# Patient Record
Sex: MALE | Race: WHITE | NOT HISPANIC OR LATINO | Employment: OTHER | URBAN - METROPOLITAN AREA
[De-identification: names, ages, dates, MRNs, and addresses within clinical notes are randomized per-mention and may not be internally consistent; named-entity substitution may affect disease eponyms.]

---

## 2024-02-13 ENCOUNTER — HOSPITAL ENCOUNTER (EMERGENCY)
Facility: CLINIC | Age: 64
Discharge: HOME | End: 2024-02-13
Attending: EMERGENCY MEDICINE | Admitting: EMERGENCY MEDICINE
Payer: MEDICARE

## 2024-02-13 ENCOUNTER — APPOINTMENT (OUTPATIENT)
Dept: MRI IMAGING | Facility: CLINIC | Age: 64
End: 2024-02-13
Attending: EMERGENCY MEDICINE
Payer: MEDICARE

## 2024-02-13 VITALS
HEART RATE: 66 BPM | BODY MASS INDEX: 35.07 KG/M2 | HEIGHT: 70 IN | DIASTOLIC BLOOD PRESSURE: 84 MMHG | WEIGHT: 245 LBS | TEMPERATURE: 97.8 F | SYSTOLIC BLOOD PRESSURE: 131 MMHG | OXYGEN SATURATION: 99 % | RESPIRATION RATE: 11 BRPM

## 2024-02-13 DIAGNOSIS — H81.90 ACUTE VESTIBULAR SYNDROME: ICD-10-CM

## 2024-02-13 LAB
ALBUMIN SERPL BCG-MCNC: 3.8 G/DL (ref 3.5–5.2)
ALP SERPL-CCNC: 65 U/L (ref 40–150)
ALT SERPL W P-5'-P-CCNC: 22 U/L (ref 0–70)
ANION GAP SERPL CALCULATED.3IONS-SCNC: 10 MMOL/L (ref 7–15)
APTT PPP: 27 SECONDS (ref 22–38)
AST SERPL W P-5'-P-CCNC: 20 U/L (ref 0–45)
BASOPHILS # BLD AUTO: 0.1 10E3/UL (ref 0–0.2)
BASOPHILS NFR BLD AUTO: 1 %
BILIRUB SERPL-MCNC: 0.3 MG/DL
BUN SERPL-MCNC: 13.1 MG/DL (ref 8–23)
CALCIUM SERPL-MCNC: 9.1 MG/DL (ref 8.8–10.2)
CHLORIDE SERPL-SCNC: 106 MMOL/L (ref 98–107)
CREAT SERPL-MCNC: 0.86 MG/DL (ref 0.67–1.17)
DEPRECATED HCO3 PLAS-SCNC: 24 MMOL/L (ref 22–29)
EGFRCR SERPLBLD CKD-EPI 2021: >90 ML/MIN/1.73M2
EOSINOPHIL # BLD AUTO: 0.2 10E3/UL (ref 0–0.7)
EOSINOPHIL NFR BLD AUTO: 4 %
ERYTHROCYTE [DISTWIDTH] IN BLOOD BY AUTOMATED COUNT: 13.8 % (ref 10–15)
GLUCOSE SERPL-MCNC: 132 MG/DL (ref 70–99)
HCT VFR BLD AUTO: 42.3 % (ref 40–53)
HGB BLD-MCNC: 14.3 G/DL (ref 13.3–17.7)
HOLD SPECIMEN: NORMAL
IMM GRANULOCYTES # BLD: 0 10E3/UL
IMM GRANULOCYTES NFR BLD: 1 %
INR PPP: 1.08 (ref 0.85–1.15)
LYMPHOCYTES # BLD AUTO: 1.5 10E3/UL (ref 0.8–5.3)
LYMPHOCYTES NFR BLD AUTO: 23 %
MCH RBC QN AUTO: 29.4 PG (ref 26.5–33)
MCHC RBC AUTO-ENTMCNC: 33.8 G/DL (ref 31.5–36.5)
MCV RBC AUTO: 87 FL (ref 78–100)
MONOCYTES # BLD AUTO: 0.5 10E3/UL (ref 0–1.3)
MONOCYTES NFR BLD AUTO: 7 %
NEUTROPHILS # BLD AUTO: 4.3 10E3/UL (ref 1.6–8.3)
NEUTROPHILS NFR BLD AUTO: 64 %
NRBC # BLD AUTO: 0 10E3/UL
NRBC BLD AUTO-RTO: 0 /100
PLATELET # BLD AUTO: 296 10E3/UL (ref 150–450)
POTASSIUM SERPL-SCNC: 4.7 MMOL/L (ref 3.4–5.3)
PROT SERPL-MCNC: 6.7 G/DL (ref 6.4–8.3)
RBC # BLD AUTO: 4.87 10E6/UL (ref 4.4–5.9)
SODIUM SERPL-SCNC: 140 MMOL/L (ref 135–145)
WBC # BLD AUTO: 6.6 10E3/UL (ref 4–11)

## 2024-02-13 PROCEDURE — 99284 EMERGENCY DEPT VISIT MOD MDM: CPT | Mod: 25 | Performed by: EMERGENCY MEDICINE

## 2024-02-13 PROCEDURE — 258N000003 HC RX IP 258 OP 636: Performed by: EMERGENCY MEDICINE

## 2024-02-13 PROCEDURE — 250N000013 HC RX MED GY IP 250 OP 250 PS 637: Performed by: EMERGENCY MEDICINE

## 2024-02-13 PROCEDURE — 99285 EMERGENCY DEPT VISIT HI MDM: CPT | Mod: 25

## 2024-02-13 PROCEDURE — 82040 ASSAY OF SERUM ALBUMIN: CPT | Performed by: EMERGENCY MEDICINE

## 2024-02-13 PROCEDURE — 85730 THROMBOPLASTIN TIME PARTIAL: CPT | Performed by: EMERGENCY MEDICINE

## 2024-02-13 PROCEDURE — 70549 MR ANGIOGRAPH NECK W/O&W/DYE: CPT | Mod: MA

## 2024-02-13 PROCEDURE — 93010 ELECTROCARDIOGRAM REPORT: CPT | Performed by: EMERGENCY MEDICINE

## 2024-02-13 PROCEDURE — 36415 COLL VENOUS BLD VENIPUNCTURE: CPT | Performed by: EMERGENCY MEDICINE

## 2024-02-13 PROCEDURE — 96360 HYDRATION IV INFUSION INIT: CPT | Mod: 25

## 2024-02-13 PROCEDURE — 99207 PR NO BILLABLE SERVICE THIS VISIT: CPT | Performed by: PHYSICIAN ASSISTANT

## 2024-02-13 PROCEDURE — 255N000002 HC RX 255 OP 636: Performed by: EMERGENCY MEDICINE

## 2024-02-13 PROCEDURE — 70553 MRI BRAIN STEM W/O & W/DYE: CPT | Mod: MA

## 2024-02-13 PROCEDURE — A9585 GADOBUTROL INJECTION: HCPCS | Performed by: EMERGENCY MEDICINE

## 2024-02-13 PROCEDURE — 70544 MR ANGIOGRAPHY HEAD W/O DYE: CPT | Mod: MA

## 2024-02-13 PROCEDURE — 85025 COMPLETE CBC W/AUTO DIFF WBC: CPT | Performed by: EMERGENCY MEDICINE

## 2024-02-13 PROCEDURE — 85610 PROTHROMBIN TIME: CPT | Performed by: EMERGENCY MEDICINE

## 2024-02-13 PROCEDURE — 93005 ELECTROCARDIOGRAM TRACING: CPT

## 2024-02-13 PROCEDURE — 96361 HYDRATE IV INFUSION ADD-ON: CPT

## 2024-02-13 RX ORDER — MECLIZINE HYDROCHLORIDE 25 MG/1
25 TABLET ORAL ONCE
Status: COMPLETED | OUTPATIENT
Start: 2024-02-13 | End: 2024-02-13

## 2024-02-13 RX ORDER — GADOBUTROL 604.72 MG/ML
10 INJECTION INTRAVENOUS ONCE
Status: COMPLETED | OUTPATIENT
Start: 2024-02-13 | End: 2024-02-13

## 2024-02-13 RX ORDER — MECLIZINE HYDROCHLORIDE 25 MG/1
25 TABLET ORAL 3 TIMES DAILY PRN
Qty: 20 TABLET | Refills: 0 | Status: SHIPPED | OUTPATIENT
Start: 2024-02-13

## 2024-02-13 RX ADMIN — GADOBUTROL 10 ML: 604.72 INJECTION INTRAVENOUS at 10:53

## 2024-02-13 RX ADMIN — SODIUM CHLORIDE 50 ML: 9 INJECTION, SOLUTION INTRAVENOUS at 10:54

## 2024-02-13 RX ADMIN — MECLIZINE HYDROCHLORIDE 25 MG: 25 TABLET ORAL at 10:31

## 2024-02-13 ASSESSMENT — ACTIVITIES OF DAILY LIVING (ADL)
ADLS_ACUITY_SCORE: 35

## 2024-02-13 ASSESSMENT — VISUAL ACUITY: OU: BASELINE;GLASSES

## 2024-02-13 NOTE — ED TRIAGE NOTES
"Patient reports ~2300 last night started to experience dizziness. Went to bed. Awoke this morning, thought he was feeling better but went to go to restroom. \"I feel like I drank a gallon of alcohol.\". Several episodes of emesis this morning. Worse with movement and turning head. Not on blood thinners. Nausea with vomiting. Denies HA. Hx of retinal occulusion/hemorrhage (right) and early macular degeneration.      Triage Assessment (Adult)       Row Name 02/13/24 0835          Triage Assessment    Airway WDL WDL        Respiratory WDL    Respiratory WDL WDL        Skin Circulation/Temperature WDL    Skin Circulation/Temperature WDL WDL        Cardiac WDL    Cardiac WDL WDL        Peripheral/Neurovascular WDL    Peripheral Neurovascular WDL WDL        Cognitive/Neuro/Behavioral WDL    Cognitive/Neuro/Behavioral WDL WDL  Dizziness                     "

## 2024-02-13 NOTE — DISCHARGE INSTRUCTIONS
MR of your brain and MRA of your head and neck was reassuring, no signs of stroke.     Meclazine may help with your symptoms, may take three times per day.     Follow up with your primary care provider if symptoms fail to improve within one week.     Return to the Emergency Department if symptoms worsen or you develop an new symptoms. For example, double vision, incoordination, numbness, weakness, speech changes.     I hope you feel better soon.

## 2024-02-13 NOTE — ED PROVIDER NOTES
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  Went to bed at about 11 PM last night when he sat down to go to bed he felt with mild sensation of the room spinning but did not think much of it and just went down and went to sleep.  Woke to the bathroom at about 5 and a with mild symptoms then went back to sleep.  At 7 AM dizziness seem to increased and felt very unsteady walking.  His wife says he was drifting to the right.  No chest pain, pressure, shortness of breath.  No fevers or chills.  No recent illness.  Takes 50 mg of losartan, 81 mg aspirin, and multivitamin.  Non-smoker.  Drinks occasionally maybe 1 beer per week.  No history of coronary artery disease or stroke.  No double vision.  Did have episode of nausea and vomiting.    The patient's PMHx, Surgical Hx, Allergies, and Medications were all reviewed with the patient.    Allergies:  No Known Allergies    Problem List:    There are no problems to display for this patient.       Past Medical History:    No past medical history on file.    Past Surgical History:    No past surgical history on file.    Family History:    No family history on file.    Social History:  Marital Status:   [2]        Medications:    meclizine (ANTIVERT) 25 MG tablet          Review of Systems  Pertinent positives and negatives mentioned in HPI    Physical Exam   BP: 124/68  Pulse: 69  Temp: 97.8  F (36.6  C)  Resp: 18  Height: 177.8 cm (5' 10\")  Weight: 111.1 kg (245 lb)  SpO2: 97 %    Physical Exam  GEN: Awake, alert, and cooperative.  Resting comfortably on cart sitting upright  HENT: MMM. External ears and nose normal bilaterally.  Atraumatic.  External canals without lesions.  EYES: EOM intact. Conjunctiva clear. No " discharge.  No spontaneous or inducible nystagmus.  No RAPD. Normal cover/uncover.   NECK: Symmetric, freely mobile.   CV : Regular rate and rhythm.  PULM: Normal effort. No wheezes, rales, or rhonchi bilaterally.  ABD: Soft, non-tender, non-distended. No rebound or guarding.   NEURO: Mental status: Alert, awake. Oriented to self, date, and place. Normal speech and language. GCS 15  Cranial Nerves: II-XII grossly intact  Motor: Follows commands x 4 extremities   Upper Extremities:   RUE: 5/5 shoulder abduction. 5/5 elbow flex/ext. 5/5 wrist flex/ext. 5/5 hand .   LUE: 5/5 shoulder abduction. 5/5 elbow flex/ext. 5/5 wrist flex/ext. 5/5 hand .    Lower Extremities:   RLE: 5/5 hip flexion. 5/5 knee flex/ext. 5/5 ankle plantar-/dorsiflexion. 5/5 EHL.   LLE: 5/5 hip flexion. 5/5 knee flex/ext. 5/5 ankle plantar-/dorsiflexion. 5/5 EHL   Sensory: Sensation intact in all 4 extremities   Coordination: Finger-nose finger intact. Heel-shin intact. Negative Romberg.neg Unsteady gait.   Hickman-Hallpike - no ineducable symptoms.     EXT: No gross deformity. Warm and well perfused.  No pitting pedal or pretibial edema.  INT: Warm. No diaphoresis. Normal color.     ED Course        Procedures         EKG: Interpreted by Mehdi Ayala MD sinus rhythm with rate of 60 bpm.  Right bundle branch block pattern present.  Normal R wave progression.  No ST segment elevations or depressions.  T wave inversion lead III.  No prior EKG for comparison.  Impression right bundle branch block with sinus rhythm.       Critical Care time:  none          Labs Ordered and Resulted from Time of ED Arrival to Time of ED Departure   COMPREHENSIVE METABOLIC PANEL - Abnormal       Result Value    Sodium 140      Potassium 4.7      Carbon Dioxide (CO2) 24      Anion Gap 10      Urea Nitrogen 13.1      Creatinine 0.86      GFR Estimate >90      Calcium 9.1      Chloride 106      Glucose 132 (*)     Alkaline Phosphatase 65      AST 20      ALT 22       Protein Total 6.7      Albumin 3.8      Bilirubin Total 0.3     INR - Normal    INR 1.08     PARTIAL THROMBOPLASTIN TIME - Normal    aPTT 27     CBC WITH PLATELETS AND DIFFERENTIAL    WBC Count 6.6      RBC Count 4.87      Hemoglobin 14.3      Hematocrit 42.3      MCV 87      MCH 29.4      MCHC 33.8      RDW 13.8      Platelet Count 296      % Neutrophils 64      % Lymphocytes 23      % Monocytes 7      % Eosinophils 4      % Basophils 1      % Immature Granulocytes 1      NRBCs per 100 WBC 0      Absolute Neutrophils 4.3      Absolute Lymphocytes 1.5      Absolute Monocytes 0.5      Absolute Eosinophils 0.2      Absolute Basophils 0.1      Absolute Immature Granulocytes 0.0      Absolute NRBCs 0.0       MR Brain w/o & w Contrast   Final Result   Impression:   1. No evidence of acute infarction or intracranial hemorrhage.   2. No abnormal enhancing lesions intracranially.   3. Tiny inferiorly pointing outpouching off the paraclinoid left ICA   (best seen on postcontrast neck MRA images series 12, image 29).   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   4. Neck MRA demonstrates patent major cervical arteries.             NAMITA TITUS MD            SYSTEM ID:  [id number]      MRA Neck (Carotids) wo & w Contrast   Final Result   Impression:   1. No evidence of acute infarction or intracranial hemorrhage.   2. No abnormal enhancing lesions intracranially.   3. Tiny inferiorly pointing outpouching off the paraclinoid left ICA   (best seen on postcontrast neck MRA images series 12, image 29).   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   4. Neck MRA demonstrates patent major cervical arteries.             NAMITA TITUS MD            SYSTEM ID:  [id number]      MRA Brain ([place]) wo Contrast   Final Result   Impression:   1. No evidence of acute infarction or intracranial hemorrhage.   2. No abnormal enhancing lesions intracranially.   3. Tiny inferiorly pointing  outpouching off the paraclinoid left ICA   (best seen on postcontrast neck MRA images series 12, image 29).   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   4. Neck MRA demonstrates patent major cervical arteries.             NAMITA TITUS MD            SYSTEM ID:  [id number]              Medications   meclizine (ANTIVERT) tablet 25 mg (25 mg Oral $Given 2/13/24 1031)   gadobutrol (GADAVIST) injection 10 mL (10 mLs Intravenous $Given 2/13/24 1053)   sodium chloride 0.9% BOLUS 50 mL (0 mLs Intravenous Stopped 2/13/24 1359)       Assessments & Plan (with Medical Decision Making)   63 year old male with past medical history of hypertension and central retinal venous occlusion with persistent vertiginous symptoms since 11 PM last evening and unsteady gait.  Symptoms appear mild although he does report nausea vomiting.  Neurologic exam reassuring with nothing localizing.  He does feel unsteady with walking with standby assist.  Did not attempt tandem with gait did not feel he would be able to complete this.  He did drift to his right at 1 point.  Non-smoker.  Occasional alcohol use.  50 mg losartan and 81 mg aspirin.  Discussed imaging options with Shandra Brand with the vascular neurology team.  My plan is for MR brain with and without and MR a of head and neck.    Sinus rhythm on monitor and later confirmed on EKG which did show sinus rhythm with right bundle branch block which apparently is new per patient's wife.  He remained hemodynamically stable while in department.  Basic lab work was generally unrevealing.  CBC, INR, PTT all were normal.  CMP grossly normal, glucose 132.    MR imaging with no evidence of acute infarction or intracranial hemorrhage.  No abnormal enhancing lesions.  There is a tiny inferiorly or pointing outpouching off the pleura [place] left ICA and infundibular dilatation of the origin of hypoplastic right PCA.  Images reviewed with stroke neurology.  No intervention at  "this time.  Given no other symptoms I have very low suspicion for a central cause.    On reassessment, patient reports improvement of his nausea after meclizine and has been able to ambulate independently to and from the bathroom.  Still feels a little \"dizzy\" particularly with movement.  We had discussion on whether or not he would like to be observed overnight and consideration of repeat imaging in the morning or if he would have a preference to go home and follow-up with his primary provider.  He prefers to go home.  Prescription for meclizine sent to his preferred pharmacy.  ED return precautions discussed.    Regarding EKG, initial EKG likely from incorrect patient.  Wife and patient stated that no EKG was performed and there were no stickers on the patient.  I was given an EKG with his name on it.  I brought this up tension of nursing staff and plan to have this removed from the electronic medical record.  Patient's wife was insistent that an incident report is filed and that charge nurse was notified.  Charge nurse notified.         I have reviewed the nursing notes.         Discharge Medication List as of 2/13/2024  1:59 PM        START taking these medications    Details   meclizine (ANTIVERT) 25 MG tablet Take 1 tablet (25 mg) by mouth 3 times daily as needed for dizziness, Disp-20 tablet, R-0, E-Prescribe             Final diagnoses:   Acute vestibular syndrome     Mehdi Ayala MD        2/13/2024   [facility] EMERGENCY DEPT    Disclaimer: This note consists of words and symbols derived from keyboarding and dictation using voice recognition software.  As a result, there may be errors that have gone undetected.  Please consider this when interpreting information found in this note.               Mehdi Ayala MD  02/14/24 1540    "

## 2024-02-13 NOTE — CONSULTS
[facility]    Stroke Telephone Note    I was called by Mehdi Loza on 02/13/24 regarding patient Jonatan Cote. The patient is a 63 year old male with PMH HTN, prior retinal venous occlusion. Currently presenting with dizziness since last night -  to some degree present peristently but worse with head movement. Has had nausea/vomiting. He is able to ambulate though feels unsteady. No other clear focal neuro symptoms suggestive as stroke such as dysmetria, diplopia, no nystagmus reported on ED provider exam.    Vitals  BP: 121/78   Pulse: 63   Resp: 18   Temp: 97.8  F (36.6  C)   Weight: 111.1 kg (245 lb)    Imaging Findings  MRI brain:1. No evidence of acute infarction or intracranial hemorrhage.  2. No abn ormal enhancing lesions intracranially.    MRA head/neck: Tiny inferiorly pointing outpouching off the paraclinoid left ICA  (best seen on postcontrast neck MRA images series 12, image 29).  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    Impression  Vertigo, nausea/vomiting without clear focal neuro symptoms suggestive of stroke, improving some since meclizine - suspect peripheral etiology    Recommendations  -Symptomatic treatment of vertigo  -If symptoms are not improving or new exam findings suggesting central etiology should be admitted for observation. After my discussion with Dr. Loza he plans to reassess the patient and discuss admission for observation  -If the patient is ultimately admitted for observation please let the  stroke team know. May consider repeating MRI with thin cuts through the brainstem and coronal DWI.    Case discussed with vascular neurology attending Dr. An.    My recommendations are based on the information provided over the phone by Jonatan NINA Rocael's in-person providers. They are not intended to replace the clinical judgment of his in-person providers. I was  "not requested to personally see or examine the patient at this time.     Tory Torres PA-C  Vascular Neurology    To page me or covering stroke neurology team member, click here: [facility]  Choose \"On Call\" tab at top, then select \"NEUROLOGY/ALL SITES\" from middle drop-down box, press Enter, then look for \"stroke\" or \"telestroke\" for your site.    "

## 2024-10-06 ENCOUNTER — HEALTH MAINTENANCE LETTER (OUTPATIENT)
Age: 64
End: 2024-10-06